# Patient Record
Sex: FEMALE | Race: WHITE | NOT HISPANIC OR LATINO | URBAN - METROPOLITAN AREA
[De-identification: names, ages, dates, MRNs, and addresses within clinical notes are randomized per-mention and may not be internally consistent; named-entity substitution may affect disease eponyms.]

---

## 2019-03-29 ENCOUNTER — OUTPATIENT (OUTPATIENT)
Dept: OUTPATIENT SERVICES | Facility: HOSPITAL | Age: 54
LOS: 1 days | Discharge: STILL ADMITTED OR ADMINISTRATIVE | End: 2019-03-29

## 2019-04-02 ENCOUNTER — LABORATORY RESULT (OUTPATIENT)
Age: 54
End: 2019-04-02

## 2019-04-02 ENCOUNTER — OUTPATIENT (OUTPATIENT)
Dept: OUTPATIENT SERVICES | Facility: HOSPITAL | Age: 54
LOS: 1 days | End: 2019-04-02

## 2019-04-02 ENCOUNTER — MED ADMIN CHARGE (OUTPATIENT)
Age: 54
End: 2019-04-02

## 2019-04-02 ENCOUNTER — OTHER (OUTPATIENT)
Age: 54
End: 2019-04-02

## 2019-04-02 ENCOUNTER — APPOINTMENT (OUTPATIENT)
Dept: INTERNAL MEDICINE | Facility: HOSPITAL | Age: 54
End: 2019-04-02
Payer: MEDICAID

## 2019-04-02 ENCOUNTER — TRANSCRIPTION ENCOUNTER (OUTPATIENT)
Age: 54
End: 2019-04-02

## 2019-04-02 VITALS — SYSTOLIC BLOOD PRESSURE: 90 MMHG | HEART RATE: 63 BPM | DIASTOLIC BLOOD PRESSURE: 50 MMHG

## 2019-04-02 VITALS — HEIGHT: 64 IN | WEIGHT: 107 LBS | BODY MASS INDEX: 18.27 KG/M2

## 2019-04-02 DIAGNOSIS — Z81.8 FAMILY HISTORY OF OTHER MENTAL AND BEHAVIORAL DISORDERS: ICD-10-CM

## 2019-04-02 DIAGNOSIS — Z82.62 FAMILY HISTORY OF OSTEOPOROSIS: ICD-10-CM

## 2019-04-02 LAB
ALBUMIN SERPL ELPH-MCNC: 4.3 G/DL — SIGNIFICANT CHANGE UP (ref 3.3–5)
ALP SERPL-CCNC: 44 U/L — SIGNIFICANT CHANGE UP (ref 40–120)
ALT FLD-CCNC: 18 U/L — SIGNIFICANT CHANGE UP (ref 4–33)
ANION GAP SERPL CALC-SCNC: 11 MMO/L — SIGNIFICANT CHANGE UP (ref 7–14)
AST SERPL-CCNC: 23 U/L — SIGNIFICANT CHANGE UP (ref 4–32)
BASOPHILS # BLD AUTO: 0.05 K/UL — SIGNIFICANT CHANGE UP (ref 0–0.2)
BASOPHILS NFR BLD AUTO: 0.8 % — SIGNIFICANT CHANGE UP (ref 0–2)
BILIRUB SERPL-MCNC: 0.5 MG/DL — SIGNIFICANT CHANGE UP (ref 0.2–1.2)
BUN SERPL-MCNC: 19 MG/DL — SIGNIFICANT CHANGE UP (ref 7–23)
CALCIUM SERPL-MCNC: 9.2 MG/DL — SIGNIFICANT CHANGE UP (ref 8.4–10.5)
CHLORIDE SERPL-SCNC: 103 MMOL/L — SIGNIFICANT CHANGE UP (ref 98–107)
CHOLEST SERPL-MCNC: 185 MG/DL — SIGNIFICANT CHANGE UP (ref 120–199)
CO2 SERPL-SCNC: 29 MMOL/L — SIGNIFICANT CHANGE UP (ref 22–31)
CREAT SERPL-MCNC: 0.66 MG/DL — SIGNIFICANT CHANGE UP (ref 0.5–1.3)
EOSINOPHIL # BLD AUTO: 0.11 K/UL — SIGNIFICANT CHANGE UP (ref 0–0.5)
EOSINOPHIL NFR BLD AUTO: 1.7 % — SIGNIFICANT CHANGE UP (ref 0–6)
GLUCOSE SERPL-MCNC: 76 MG/DL — SIGNIFICANT CHANGE UP (ref 70–99)
HCT VFR BLD CALC: 42.5 % — SIGNIFICANT CHANGE UP (ref 34.5–45)
HDLC SERPL-MCNC: 80 MG/DL — HIGH (ref 45–65)
HGB BLD-MCNC: 13.8 G/DL — SIGNIFICANT CHANGE UP (ref 11.5–15.5)
IMM GRANULOCYTES NFR BLD AUTO: 0.3 % — SIGNIFICANT CHANGE UP (ref 0–1.5)
LIPID PNL WITH DIRECT LDL SERPL: 108 MG/DL — SIGNIFICANT CHANGE UP
LYMPHOCYTES # BLD AUTO: 2.05 K/UL — SIGNIFICANT CHANGE UP (ref 1–3.3)
LYMPHOCYTES # BLD AUTO: 31.9 % — SIGNIFICANT CHANGE UP (ref 13–44)
MCHC RBC-ENTMCNC: 29.2 PG — SIGNIFICANT CHANGE UP (ref 27–34)
MCHC RBC-ENTMCNC: 32.5 % — SIGNIFICANT CHANGE UP (ref 32–36)
MCV RBC AUTO: 89.9 FL — SIGNIFICANT CHANGE UP (ref 80–100)
MONOCYTES # BLD AUTO: 0.63 K/UL — SIGNIFICANT CHANGE UP (ref 0–0.9)
MONOCYTES NFR BLD AUTO: 9.8 % — SIGNIFICANT CHANGE UP (ref 2–14)
NEUTROPHILS # BLD AUTO: 3.56 K/UL — SIGNIFICANT CHANGE UP (ref 1.8–7.4)
NEUTROPHILS NFR BLD AUTO: 55.5 % — SIGNIFICANT CHANGE UP (ref 43–77)
NRBC # FLD: 0 K/UL — SIGNIFICANT CHANGE UP (ref 0–0)
PLATELET # BLD AUTO: 249 K/UL — SIGNIFICANT CHANGE UP (ref 150–400)
PMV BLD: 11.2 FL — SIGNIFICANT CHANGE UP (ref 7–13)
POTASSIUM SERPL-MCNC: 4.1 MMOL/L — SIGNIFICANT CHANGE UP (ref 3.5–5.3)
POTASSIUM SERPL-SCNC: 4.1 MMOL/L — SIGNIFICANT CHANGE UP (ref 3.5–5.3)
PROT SERPL-MCNC: 6.6 G/DL — SIGNIFICANT CHANGE UP (ref 6–8.3)
RBC # BLD: 4.73 M/UL — SIGNIFICANT CHANGE UP (ref 3.8–5.2)
RBC # FLD: 12.9 % — SIGNIFICANT CHANGE UP (ref 10.3–14.5)
SODIUM SERPL-SCNC: 143 MMOL/L — SIGNIFICANT CHANGE UP (ref 135–145)
T4 FREE SERPL-MCNC: 1.45 NG/DL — SIGNIFICANT CHANGE UP (ref 0.9–1.8)
TRIGL SERPL-MCNC: 78 MG/DL — SIGNIFICANT CHANGE UP (ref 10–149)
TSH SERPL-MCNC: < 0.1 UIU/ML — LOW (ref 0.27–4.2)
WBC # BLD: 6.42 K/UL — SIGNIFICANT CHANGE UP (ref 3.8–10.5)
WBC # FLD AUTO: 6.42 K/UL — SIGNIFICANT CHANGE UP (ref 3.8–10.5)

## 2019-04-02 PROCEDURE — 99214 OFFICE O/P EST MOD 30 MIN: CPT | Mod: GC

## 2019-04-03 LAB
24R-OH-CALCIDIOL SERPL-MCNC: 65.5 NG/ML — SIGNIFICANT CHANGE UP (ref 30–80)
MEV IGG SER-ACNC: >300 AU/ML — SIGNIFICANT CHANGE UP
MEV IGG+IGM SER-IMP: POSITIVE — SIGNIFICANT CHANGE UP
MUV AB SER-ACNC: POSITIVE — SIGNIFICANT CHANGE UP
MUV IGG FLD-ACNC: >300 AU/ML — SIGNIFICANT CHANGE UP
RUBV IGG SER-ACNC: 2.2 INDEX — SIGNIFICANT CHANGE UP
RUBV IGG SER-IMP: POSITIVE — SIGNIFICANT CHANGE UP

## 2019-04-03 NOTE — REVIEW OF SYSTEMS
08/29/18 1000   Signing Clinician's Name / Credentials   Signing clinician's name / credentials Marilyn Irvin OTR/L   Quick Adds   Rehab Discipline OT   Therapeutic Activity   Minutes of Treatment 35 minutes   Symptoms Noted During/After Treatment None   Treatment Detail Chakra connection completed with patient. to aid with relaxation and well being. Patient notes anxiety due to recent aspiration of the fluid pouch on her (R) chest.     Therapeutic Exercise   Minutes of Treatment 5   Symptoms Noted During/After Treatment none   Treatment Detail OK to begin s/p mastectomy stretches and teaching of lymphedema massage from Dr. Parth Vaughan on 8/23/18. Held teaching of edema massage due to recent testing of fluid pouch on (R) side. Did review UE stretches with patient.    Additional Documentation   Rehab Comments Pt. reports negative thinking about recent procedures and has concerns about death and wanting quality of life.    OT Plan cont 3x/week for healing touch modalities and healthy coping skills as well as stretches and manual edema massage when appropriate.    Total Session Time   Total Session Time (minutes) 40 minutes      [Constipation] : constipation [Anxiety] : anxiety [Depression] : depression [Negative] : Neurological [Suicidal] : not suicidal [Insomnia] : no insomnia [FreeTextEntry7] : chronic constipation

## 2019-04-03 NOTE — PHYSICAL EXAM
[No Acute Distress] : no acute distress [Well Nourished] : well nourished [Well Developed] : well developed [Normal Sclera/Conjunctiva] : normal sclera/conjunctiva [Normal Outer Ear/Nose] : the outer ears and nose were normal in appearance [Supple] : supple [No Respiratory Distress] : no respiratory distress  [Clear to Auscultation] : lungs were clear to auscultation bilaterally [Normal Rate] : normal rate  [Regular Rhythm] : with a regular rhythm [Normal S1, S2] : normal S1 and S2 [No Murmur] : no murmur heard [Soft] : abdomen soft [Non Tender] : non-tender [Non-distended] : non-distended [No Masses] : no abdominal mass palpated [No HSM] : no HSM [No Joint Swelling] : no joint swelling [No Rash] : no rash [Speech Grossly Normal] : speech grossly normal [Normal Insight/Judgement] : insight and judgment were intact [de-identified] : thin female in NAD

## 2019-04-03 NOTE — HEALTH RISK ASSESSMENT
[Fair] :  ~his/her~ mood as fair [0-5] : 0-5 [No falls in past year] : Patient reported no falls in the past year [2] : 2) Feeling down, depressed, or hopeless for more than half of the days (2) [Patient reported mammogram was normal] : Patient reported mammogram was normal [Patient reported PAP Smear was normal] : Patient reported PAP Smear was normal [Patient reported bone density results were abnormal] : Patient reported bone density results were abnormal [Patient reported colonoscopy was normal] : Patient reported colonoscopy was normal [With Family] : lives with family [] : No [YearQuit] : 2009 [[id number]] : 5 [MammogramDate] : 06/17 [PapSmearDate] : 06/18 [BoneDensityDate] : 11/18 [ColonoscopyDate] : 01/19

## 2019-04-03 NOTE — HISTORY OF PRESENT ILLNESS
[Family Member] : family member [FreeTextEntry1] : establish care [de-identified] : 53F w/ depression (since 2001), hypothyroidism, osteoporosis, who presents to establish regular medical care following visit to [facility] last Friday. 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 Pt states that she recently took on too many responsibilities and did not realize that her coworkers found it difficult to work for her; multiple complaints were filed and pt was dismissed from her job. Pt had numerous panic attacks, and finally decided to move back to [place] with her parents. She is accompanied today by her twin sister, who is helping her get back on her feet. Sister and pt state that when she first arrived, pt had significantly low mood, guilt, trouble sleeping and fidgeting; however, she has been slowly improving. She has started school for [employer] (pt's sister does coding for [email or web address]) and needs forms filled out today.  She was initially seeing her sister's psychiatrist (sister also has depression) but would like to establish care with someone else. Her citalopram was recently increased to 40mg qd and she was recently started on remeron 15mg qd, which she has been tolerating well. Denies suidical/homicidal ideations. PHQ9 =5 today. \par \par Pt had recent DEXA (hip T-3.5, total  T -2.7, records in chart). Pt was started on alendronic acid 70mg qweekly in 11/18. Her mother also has osteoporosis. No recent falls or bone pain. \par \par ROS neg for headaches, CP, SOB, urinary or bowel changes.

## 2019-04-03 NOTE — ASSESSMENT
[FreeTextEntry1] : 53F w/ hypothyroidism, depression and osteoporosis presenting to establish care.\par \par #Depression, PHQ9 = 5\par -pt appears to be coping well w/ recent stress of losing job. is now back in school for coding\par -referral to care mgmt given for psychiatrist\par -c/w citalopram 40mg qd, remeron 15mg qhs\par -stress counseling provided\par \par #Hypothyroidism\par -c/w 100mcg synthroid daily\par -TFTs sent today\par \par #Osteoporosis\par -c/w alendronic acid 70mg qweekly\par -last DEXA 11/18 showning osteoporosis\par -started on Vit D 1000U daily\par -Ca, Vit D levels sent today\par \par #HCM\par -routine labs - cbc, cmp, lipid panel today\par -MMR titers drawn for school, form to be filled out later this week\par -last DEXA 11/19 showing osteoporosis\par -Mammogram ( last 6/17), PAP+HPV (last 6/18), colonoscopy screening wnl and UTD (next rec'd at age 58)\par -flu shot today\par \par RTC in 10-15wks\par Deshawn Horan, PGY1\par Firm 1\par Case d/w Dr Euceda

## 2019-04-05 ENCOUNTER — RESULT REVIEW (OUTPATIENT)
Age: 54
End: 2019-04-05

## 2019-04-12 DIAGNOSIS — Z23 ENCOUNTER FOR IMMUNIZATION: ICD-10-CM

## 2019-04-12 DIAGNOSIS — F32.9 MAJOR DEPRESSIVE DISORDER, SINGLE EPISODE, UNSPECIFIED: ICD-10-CM

## 2019-06-10 ENCOUNTER — LABORATORY RESULT (OUTPATIENT)
Age: 54
End: 2019-06-10

## 2019-06-10 ENCOUNTER — APPOINTMENT (OUTPATIENT)
Dept: INTERNAL MEDICINE | Facility: CLINIC | Age: 54
End: 2019-06-10
Payer: MEDICAID

## 2019-06-10 ENCOUNTER — OUTPATIENT (OUTPATIENT)
Dept: OUTPATIENT SERVICES | Facility: HOSPITAL | Age: 54
LOS: 1 days | End: 2019-06-10

## 2019-06-10 VITALS
SYSTOLIC BLOOD PRESSURE: 90 MMHG | BODY MASS INDEX: 17.24 KG/M2 | OXYGEN SATURATION: 100 % | WEIGHT: 101 LBS | DIASTOLIC BLOOD PRESSURE: 50 MMHG | HEART RATE: 63 BPM | HEIGHT: 64 IN

## 2019-06-10 LAB
T4 FREE SERPL-MCNC: 0.93 NG/DL — SIGNIFICANT CHANGE UP (ref 0.9–1.8)
TSH SERPL-MCNC: 1.34 UIU/ML — SIGNIFICANT CHANGE UP (ref 0.27–4.2)

## 2019-06-10 PROCEDURE — 99213 OFFICE O/P EST LOW 20 MIN: CPT | Mod: GE

## 2019-06-10 RX ORDER — CITALOPRAM 10 MG/1
10 TABLET, FILM COATED ORAL
Refills: 0 | Status: ACTIVE | COMMUNITY
Start: 2019-04-02

## 2019-06-10 RX ORDER — DIAZEPAM 2 MG/1
2 TABLET ORAL
Refills: 0 | Status: DISCONTINUED | COMMUNITY
Start: 2019-04-02 | End: 2019-06-10

## 2019-06-10 RX ORDER — ESTRADIOL 0.1 MG/G
0.1 CREAM VAGINAL
Qty: 1 | Refills: 3 | Status: DISCONTINUED | COMMUNITY
Start: 2019-04-02 | End: 2019-06-10

## 2019-06-10 RX ORDER — MIRTAZAPINE 15 MG/1
15 TABLET, FILM COATED ORAL
Qty: 30 | Refills: 3 | Status: ACTIVE | COMMUNITY
Start: 2019-04-02 | End: 1900-01-01

## 2019-06-10 RX ORDER — POLYETHYLENE GLYCOL 3350 17 G/17G
17 POWDER, FOR SOLUTION ORAL
Qty: 5 | Refills: 5 | Status: ACTIVE | COMMUNITY
Start: 2019-04-02 | End: 1900-01-01

## 2019-06-11 LAB
HCV AB S/CO SERPL IA: 0.09 S/CO — SIGNIFICANT CHANGE UP (ref 0–0.99)
HCV AB SERPL-IMP: SIGNIFICANT CHANGE UP
HCV RNA SERPL NAA DL=5-ACNC: NOT DETECTED IU/ML — SIGNIFICANT CHANGE UP
HCV RNA SPEC NAA+PROBE-LOG IU: SIGNIFICANT CHANGE UP LOGIU/ML
HIV 1+2 AB+HIV1 P24 AG SERPL QL IA: SIGNIFICANT CHANGE UP

## 2019-06-13 NOTE — END OF VISIT
[] : Resident [FreeTextEntry3] : 54yo F with depression, hypothyroidism here to follow up after establishing with us 2 mo ago. in intermin has established with a psychiatrist at [facility]. Pt's hypothyroid dose reduced by 100mcg to 88mcg and sx resolved. Ranger recommended at age 58. Pt will bring in remainder of screening results. SHe may need shingrix in the near future.

## 2019-06-13 NOTE — REVIEW OF SYSTEMS
[Negative] : Musculoskeletal [Fever] : no fever [Hot Flashes] : no hot flashes [Chills] : no chills [Night Sweats] : no night sweats [Abdominal Pain] : no abdominal pain [Dysuria] : no dysuria

## 2019-06-13 NOTE — HISTORY OF PRESENT ILLNESS
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 She was dismissed and had several episodes of depressed mood, trouble sleeping and fidgeting, including one visit to [facility] for anxiety. She was referred to several psychiatry centers per care mgmt referral.\par Today, pt reports she has been feeling well. 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 She is also volunteering at [facility] regularly. She has been seeing a psychiatrist at [facility] (Dr Jessica Spivey ?sp). Her citalopram has been increased to 50mg qd and she has been continuing remeron 15mg qhs. \par Hypothyroidism: At last visit, pt was noted to have TSH<0.10 on 100mcg synthroid and reported palpitations, diarrhea, so it was decreased to 88mcg. Today, pt reports resolution of symptoms. She started taking the 88mcg of levothyroxine only about 4wks ago, as she was still receiving 100mcg from her old pharmacy and did not realize she had to switch her dose until more recently.\par \par Osteoporosis: last DEXA (hip T-3.5, total T -2.7 in ). Pt was started on alendronic acid 70mg qweekly in 11/18. Her mother also has osteoporosis. No recent falls or bone pain. \par \par Otherwise, ROS neg for recent wt loss, fatigue, headaches, CP, SOB, urinary or bowel changes.

## 2019-06-13 NOTE — HEALTH RISK ASSESSMENT
[0-5] : 0-5 [No falls in past year] : Patient reported no falls in the past year [0] : 2) Feeling down, depressed, or hopeless: Not at all (0) [YearQuit] : 2009 [de-identified] : former smoker [] : No

## 2019-06-13 NOTE — ASSESSMENT
[FreeTextEntry1] : 53F w/ depression on citalopram, remeron (since 2001), hypothyroidism, osteoporosis who is presenting for followup of chronic medical conditions.\par \par See above for detailed A/P of chronic conditions\par \par #HCM\par -MMR titers drawn last visit, immune\par -last DEXA 11/19 showing osteoporosis.\par -Mammogram ( last 6/17), PAP+HPV (last 6/18), colonoscopy screening wnl and UTD (next rec'd at age 58)\par -flu shot 2/19\par -Tdap - will review old records next visit. pt to bring in\par -HIV/HCV next visit\par \par RTC in 6mths for f.u of hypothyroidism and depression\par Case d/w Dr Pitts\par \par Deshawn Horan, PGY1\par Firm 1

## 2019-06-13 NOTE — PHYSICAL EXAM
[No Acute Distress] : no acute distress [Normal Sclera/Conjunctiva] : normal sclera/conjunctiva [Well-Appearing] : well-appearing [Normal Outer Ear/Nose] : the outer ears and nose were normal in appearance [Normal Oropharynx] : the oropharynx was normal [Supple] : supple [No Respiratory Distress] : no respiratory distress  [Clear to Auscultation] : lungs were clear to auscultation bilaterally [Normal Rate] : normal rate  [Non Tender] : non-tender [Soft] : abdomen soft [No Edema] : there was no peripheral edema [Non-distended] : non-distended [Normal Gait] : normal gait [No Rash] : no rash [de-identified] : thin female, pleasant

## 2019-06-14 DIAGNOSIS — N89.8 OTHER SPECIFIED NONINFLAMMATORY DISORDERS OF VAGINA: ICD-10-CM

## 2019-06-14 DIAGNOSIS — F32.9 MAJOR DEPRESSIVE DISORDER, SINGLE EPISODE, UNSPECIFIED: ICD-10-CM

## 2019-06-14 DIAGNOSIS — E03.9 HYPOTHYROIDISM, UNSPECIFIED: ICD-10-CM

## 2019-06-14 DIAGNOSIS — Z00.00 ENCOUNTER FOR GENERAL ADULT MEDICAL EXAMINATION WITHOUT ABNORMAL FINDINGS: ICD-10-CM

## 2019-06-14 DIAGNOSIS — M81.0 AGE-RELATED OSTEOPOROSIS WITHOUT CURRENT PATHOLOGICAL FRACTURE: ICD-10-CM

## 2019-06-14 DIAGNOSIS — K59.00 CONSTIPATION, UNSPECIFIED: ICD-10-CM

## 2019-10-21 ENCOUNTER — RX RENEWAL (OUTPATIENT)
Age: 54
End: 2019-10-21

## 2019-10-22 ENCOUNTER — OTHER (OUTPATIENT)
Age: 54
End: 2019-10-22

## 2019-11-29 ENCOUNTER — RX RENEWAL (OUTPATIENT)
Age: 54
End: 2019-11-29

## 2020-05-18 ENCOUNTER — RX RENEWAL (OUTPATIENT)
Age: 55
End: 2020-05-18

## 2020-05-27 ENCOUNTER — APPOINTMENT (OUTPATIENT)
Dept: INTERNAL MEDICINE | Facility: CLINIC | Age: 55
End: 2020-05-27

## 2020-06-24 ENCOUNTER — RX RENEWAL (OUTPATIENT)
Age: 55
End: 2020-06-24

## 2020-07-22 ENCOUNTER — APPOINTMENT (OUTPATIENT)
Dept: INTERNAL MEDICINE | Facility: CLINIC | Age: 55
End: 2020-07-22

## 2020-08-05 ENCOUNTER — LABORATORY RESULT (OUTPATIENT)
Age: 55
End: 2020-08-05

## 2020-08-05 ENCOUNTER — APPOINTMENT (OUTPATIENT)
Dept: INTERNAL MEDICINE | Facility: CLINIC | Age: 55
End: 2020-08-05
Payer: MEDICAID

## 2020-08-05 ENCOUNTER — OUTPATIENT (OUTPATIENT)
Dept: OUTPATIENT SERVICES | Facility: HOSPITAL | Age: 55
LOS: 1 days | End: 2020-08-05

## 2020-08-05 VITALS
SYSTOLIC BLOOD PRESSURE: 100 MMHG | HEART RATE: 77 BPM | OXYGEN SATURATION: 96 % | WEIGHT: 110 LBS | DIASTOLIC BLOOD PRESSURE: 60 MMHG | HEIGHT: 64 IN | BODY MASS INDEX: 18.78 KG/M2

## 2020-08-05 DIAGNOSIS — N89.8 OTHER SPECIFIED NONINFLAMMATORY DISORDERS OF VAGINA: ICD-10-CM

## 2020-08-05 DIAGNOSIS — Z87.19 PERSONAL HISTORY OF OTHER DISEASES OF THE DIGESTIVE SYSTEM: ICD-10-CM

## 2020-08-05 LAB
HBA1C BLD-MCNC: 5.3 % — SIGNIFICANT CHANGE UP (ref 4–5.6)
T4 FREE SERPL-MCNC: 0.92 NG/DL — SIGNIFICANT CHANGE UP (ref 0.9–1.8)
TSH SERPL-MCNC: 1.43 UIU/ML — SIGNIFICANT CHANGE UP (ref 0.27–4.2)

## 2020-08-05 PROCEDURE — 99213 OFFICE O/P EST LOW 20 MIN: CPT | Mod: GE

## 2020-08-05 RX ORDER — CLOBETASOL PROPIONATE 0.5 MG/G
0.05 CREAM TOPICAL TWICE DAILY
Qty: 1 | Refills: 1 | Status: ACTIVE | COMMUNITY
Start: 2020-08-05 | End: 1900-01-01

## 2020-08-05 RX ORDER — DICLOFENAC SODIUM 10 MG/G
1 GEL TOPICAL DAILY
Qty: 1 | Refills: 0 | Status: ACTIVE | COMMUNITY
Start: 2020-08-05 | End: 1900-01-01

## 2020-08-05 NOTE — COUNSELING
[Fall prevention counseling provided] : Fall prevention counseling provided [Adequate lighting] : Adequate lighting [No throw rugs] : No throw rugs [Use proper foot wear] : Use proper foot wear [Behavioral health counseling provided] : Behavioral health counseling provided [Engage in a relaxing activity] : Engage in a relaxing activity

## 2020-08-05 NOTE — PHYSICAL EXAM
[No Acute Distress] : no acute distress [Well-Appearing] : well-appearing [Normal Outer Ear/Nose] : the outer ears and nose were normal in appearance [Normal Sclera/Conjunctiva] : normal sclera/conjunctiva [Supple] : supple [Normal Oropharynx] : the oropharynx was normal [No Respiratory Distress] : no respiratory distress  [Normal Rate] : normal rate  [Clear to Auscultation] : lungs were clear to auscultation bilaterally [No Edema] : there was no peripheral edema [Soft] : abdomen soft [Non-distended] : non-distended [Non Tender] : non-tender [Normal Gait] : normal gait [No Rash] : no rash [No Joint Swelling] : no joint swelling [Grossly Normal Strength/Tone] : grossly normal strength/tone [de-identified] : pleasant female, thin body habitus [de-identified] : mildly limited passive ROM with extension of R arm; no shoulder point tenderness, swelling or erythema; no difficulties with ab/adduction, flexion; L arm wnl

## 2020-08-05 NOTE — REVIEW OF SYSTEMS
[Negative] : Respiratory [Fever] : no fever [Chills] : no chills [Hot Flashes] : no hot flashes [Night Sweats] : no night sweats [Abdominal Pain] : no abdominal pain [Dysuria] : no dysuria [Joint Pain] : joint pain [Muscle Weakness] : no muscle weakness [Suicidal] : not suicidal
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
## 2020-08-05 NOTE — ASSESSMENT
[FreeTextEntry1] : 55F w/ depression on citalopram, remeron (since 2001), hypothyroidism, osteoporosis who is presenting for followup of chronic medical conditions.\par \par See above for detailed A/P of chronic conditions\par \par #HCM\par -MMR titers drawn last visit, immune\par -last DEXA 11/18 showing osteoporosis - will repeat\par -Mammogram ( last 6/17) - repeat ordered\par - PAP+HPV (last 6/18)- next 2023\par - colonoscopy screening wnl and UTD (next rec'd at age 58, 2023)\par -flu shot 2/19 - will administer next visit\par -Tdap - ordered today\par -HIV/HCV wnl\par \par RTC in 6mths \par \par Case d/w Dr Loki Lundberg\par Deshawn Horan, PGY3\par Firm 1

## 2020-08-05 NOTE — HEALTH RISK ASSESSMENT
[No falls in past year] : Patient reported no falls in the past year [0-5] : 0-5 [] : No [0] : 2) Feeling down, depressed, or hopeless: Not at all (0) [de-identified] : former smoker [YearQuit] : 2009

## 2020-08-05 NOTE — END OF VISIT
[] : Resident [Time Spent: ___ minutes] : I have spent [unfilled] minutes of time on the encounter. [FreeTextEntry3] : R shoulder pain- no red flag symptoms suggestive of rotator cuff tear. Symptomatic treatment. Limit use. If symptoms worsen can consider MRI to r/o tear\par Hypothyroidism- TFT's today. Synthroid 88mcg. Asymptomatic\par Lichen Sclerosis- Clobetasol cream and Derm referral \par

## 2020-08-07 DIAGNOSIS — E03.9 HYPOTHYROIDISM, UNSPECIFIED: ICD-10-CM

## 2020-08-07 DIAGNOSIS — Z23 ENCOUNTER FOR IMMUNIZATION: ICD-10-CM

## 2020-08-07 DIAGNOSIS — K59.00 CONSTIPATION, UNSPECIFIED: ICD-10-CM

## 2020-08-07 DIAGNOSIS — M81.0 AGE-RELATED OSTEOPOROSIS WITHOUT CURRENT PATHOLOGICAL FRACTURE: ICD-10-CM

## 2020-08-07 DIAGNOSIS — N89.8 OTHER SPECIFIED NONINFLAMMATORY DISORDERS OF VAGINA: ICD-10-CM

## 2020-08-07 DIAGNOSIS — F32.9 MAJOR DEPRESSIVE DISORDER, SINGLE EPISODE, UNSPECIFIED: ICD-10-CM

## 2020-08-07 DIAGNOSIS — S46.001A UNSPECIFIED INJURY OF MUSCLE(S) AND TENDON(S) OF THE ROTATOR CUFF OF RIGHT SHOULDER, INITIAL ENCOUNTER: ICD-10-CM

## 2020-08-07 DIAGNOSIS — L90.0 LICHEN SCLEROSUS ET ATROPHICUS: ICD-10-CM

## 2020-10-15 ENCOUNTER — RX RENEWAL (OUTPATIENT)
Age: 55
End: 2020-10-15

## 2020-10-20 ENCOUNTER — APPOINTMENT (OUTPATIENT)
Dept: MAMMOGRAPHY | Facility: IMAGING CENTER | Age: 55
End: 2020-10-20
Payer: MEDICAID

## 2020-10-20 ENCOUNTER — RESULT REVIEW (OUTPATIENT)
Age: 55
End: 2020-10-20

## 2020-10-20 ENCOUNTER — OUTPATIENT (OUTPATIENT)
Dept: OUTPATIENT SERVICES | Facility: HOSPITAL | Age: 55
LOS: 1 days | End: 2020-10-20
Payer: MEDICAID

## 2020-10-20 ENCOUNTER — APPOINTMENT (OUTPATIENT)
Dept: RADIOLOGY | Facility: IMAGING CENTER | Age: 55
End: 2020-10-20
Payer: MEDICAID

## 2020-10-20 DIAGNOSIS — M81.0 AGE-RELATED OSTEOPOROSIS WITHOUT CURRENT PATHOLOGICAL FRACTURE: ICD-10-CM

## 2020-10-20 DIAGNOSIS — Z00.00 ENCOUNTER FOR GENERAL ADULT MEDICAL EXAMINATION WITHOUT ABNORMAL FINDINGS: ICD-10-CM

## 2020-10-20 PROCEDURE — 77080 DXA BONE DENSITY AXIAL: CPT

## 2020-10-20 PROCEDURE — 77080 DXA BONE DENSITY AXIAL: CPT | Mod: 26

## 2020-10-20 PROCEDURE — 77067 SCR MAMMO BI INCL CAD: CPT | Mod: 26

## 2020-10-20 PROCEDURE — 77063 BREAST TOMOSYNTHESIS BI: CPT | Mod: 26

## 2020-10-20 PROCEDURE — 77063 BREAST TOMOSYNTHESIS BI: CPT

## 2020-10-20 PROCEDURE — 77067 SCR MAMMO BI INCL CAD: CPT

## 2020-10-22 ENCOUNTER — NON-APPOINTMENT (OUTPATIENT)
Age: 55
End: 2020-10-22

## 2020-10-27 ENCOUNTER — OUTPATIENT (OUTPATIENT)
Dept: OUTPATIENT SERVICES | Facility: HOSPITAL | Age: 55
LOS: 1 days | End: 2020-10-27
Payer: MEDICAID

## 2020-10-27 ENCOUNTER — RESULT REVIEW (OUTPATIENT)
Age: 55
End: 2020-10-27

## 2020-10-27 ENCOUNTER — APPOINTMENT (OUTPATIENT)
Dept: MAMMOGRAPHY | Facility: IMAGING CENTER | Age: 55
End: 2020-10-27
Payer: MEDICAID

## 2020-10-27 DIAGNOSIS — Z00.8 ENCOUNTER FOR OTHER GENERAL EXAMINATION: ICD-10-CM

## 2020-10-27 PROCEDURE — 77066 DX MAMMO INCL CAD BI: CPT

## 2020-10-27 PROCEDURE — 77066 DX MAMMO INCL CAD BI: CPT | Mod: 26

## 2020-11-09 ENCOUNTER — RX RENEWAL (OUTPATIENT)
Age: 55
End: 2020-11-09

## 2021-01-19 ENCOUNTER — RX RENEWAL (OUTPATIENT)
Age: 56
End: 2021-01-19

## 2021-03-03 ENCOUNTER — APPOINTMENT (OUTPATIENT)
Dept: INTERNAL MEDICINE | Facility: CLINIC | Age: 56
End: 2021-03-03

## 2021-03-08 ENCOUNTER — RX RENEWAL (OUTPATIENT)
Age: 56
End: 2021-03-08

## 2021-03-29 ENCOUNTER — RX RENEWAL (OUTPATIENT)
Age: 56
End: 2021-03-29

## 2021-05-11 ENCOUNTER — APPOINTMENT (OUTPATIENT)
Dept: INTERNAL MEDICINE | Facility: CLINIC | Age: 56
End: 2021-05-11
Payer: MEDICAID

## 2021-05-11 ENCOUNTER — OUTPATIENT (OUTPATIENT)
Dept: OUTPATIENT SERVICES | Facility: HOSPITAL | Age: 56
LOS: 1 days | End: 2021-05-11

## 2021-05-11 ENCOUNTER — RESULT REVIEW (OUTPATIENT)
Age: 56
End: 2021-05-11

## 2021-05-11 VITALS
BODY MASS INDEX: 19.81 KG/M2 | DIASTOLIC BLOOD PRESSURE: 60 MMHG | HEIGHT: 64 IN | OXYGEN SATURATION: 97 % | HEART RATE: 74 BPM | WEIGHT: 116 LBS | SYSTOLIC BLOOD PRESSURE: 112 MMHG

## 2021-05-11 VITALS — TEMPERATURE: 98 F

## 2021-05-11 DIAGNOSIS — F32.9 MAJOR DEPRESSIVE DISORDER, SINGLE EPISODE, UNSPECIFIED: ICD-10-CM

## 2021-05-11 DIAGNOSIS — M81.0 AGE-RELATED OSTEOPOROSIS W/OUT CURRENT PATHOLOGICAL FRACTURE: ICD-10-CM

## 2021-05-11 DIAGNOSIS — Z23 ENCOUNTER FOR IMMUNIZATION: ICD-10-CM

## 2021-05-11 DIAGNOSIS — Z00.00 ENCOUNTER FOR GENERAL ADULT MEDICAL EXAMINATION W/OUT ABNORMAL FINDINGS: ICD-10-CM

## 2021-05-11 DIAGNOSIS — L90.0 LICHEN SCLEROSUS ET ATROPHICUS: ICD-10-CM

## 2021-05-11 DIAGNOSIS — S46.001A UNSPECIFIED INJURY OF MUSCLE(S) AND TENDON(S) OF THE ROTATOR CUFF OF RIGHT SHOULDER, INITIAL ENCOUNTER: ICD-10-CM

## 2021-05-11 LAB
ALBUMIN SERPL ELPH-MCNC: 4.4 G/DL — SIGNIFICANT CHANGE UP (ref 3.3–5)
ALP SERPL-CCNC: 63 U/L — SIGNIFICANT CHANGE UP (ref 40–120)
ALT FLD-CCNC: 19 U/L — SIGNIFICANT CHANGE UP (ref 4–33)
ANION GAP SERPL CALC-SCNC: 5 MMOL/L — LOW (ref 7–14)
AST SERPL-CCNC: 27 U/L — SIGNIFICANT CHANGE UP (ref 4–32)
BASOPHILS # BLD AUTO: 0.07 K/UL — SIGNIFICANT CHANGE UP (ref 0–0.2)
BASOPHILS NFR BLD AUTO: 1.1 % — SIGNIFICANT CHANGE UP (ref 0–2)
BILIRUB SERPL-MCNC: 0.4 MG/DL — SIGNIFICANT CHANGE UP (ref 0.2–1.2)
BUN SERPL-MCNC: 20 MG/DL — SIGNIFICANT CHANGE UP (ref 7–23)
CALCIUM SERPL-MCNC: 9.2 MG/DL — SIGNIFICANT CHANGE UP (ref 8.4–10.5)
CHLORIDE SERPL-SCNC: 104 MMOL/L — SIGNIFICANT CHANGE UP (ref 98–107)
CHOLEST SERPL-MCNC: 204 MG/DL — HIGH
CO2 SERPL-SCNC: 32 MMOL/L — HIGH (ref 22–31)
CREAT SERPL-MCNC: 0.64 MG/DL — SIGNIFICANT CHANGE UP (ref 0.5–1.3)
EOSINOPHIL # BLD AUTO: 0.15 K/UL — SIGNIFICANT CHANGE UP (ref 0–0.5)
EOSINOPHIL NFR BLD AUTO: 2.3 % — SIGNIFICANT CHANGE UP (ref 0–6)
GLUCOSE SERPL-MCNC: 91 MG/DL — SIGNIFICANT CHANGE UP (ref 70–99)
HCT VFR BLD CALC: 41.7 % — SIGNIFICANT CHANGE UP (ref 34.5–45)
HDLC SERPL-MCNC: 86 MG/DL — SIGNIFICANT CHANGE UP
HGB BLD-MCNC: 12.8 G/DL — SIGNIFICANT CHANGE UP (ref 11.5–15.5)
IANC: 3.41 K/UL — SIGNIFICANT CHANGE UP (ref 1.5–8.5)
IMM GRANULOCYTES NFR BLD AUTO: 0.3 % — SIGNIFICANT CHANGE UP (ref 0–1.5)
LIPID PNL WITH DIRECT LDL SERPL: 101 MG/DL — HIGH
LYMPHOCYTES # BLD AUTO: 2.34 K/UL — SIGNIFICANT CHANGE UP (ref 1–3.3)
LYMPHOCYTES # BLD AUTO: 35.2 % — SIGNIFICANT CHANGE UP (ref 13–44)
MCHC RBC-ENTMCNC: 28.6 PG — SIGNIFICANT CHANGE UP (ref 27–34)
MCHC RBC-ENTMCNC: 30.7 GM/DL — LOW (ref 32–36)
MCV RBC AUTO: 93.1 FL — SIGNIFICANT CHANGE UP (ref 80–100)
MONOCYTES # BLD AUTO: 0.66 K/UL — SIGNIFICANT CHANGE UP (ref 0–0.9)
MONOCYTES NFR BLD AUTO: 9.9 % — SIGNIFICANT CHANGE UP (ref 2–14)
NEUTROPHILS # BLD AUTO: 3.41 K/UL — SIGNIFICANT CHANGE UP (ref 1.8–7.4)
NEUTROPHILS NFR BLD AUTO: 51.2 % — SIGNIFICANT CHANGE UP (ref 43–77)
NON HDL CHOLESTEROL: 118 MG/DL — SIGNIFICANT CHANGE UP
NRBC # BLD: 0 /100 WBCS — SIGNIFICANT CHANGE UP
NRBC # FLD: 0 K/UL — SIGNIFICANT CHANGE UP
PLATELET # BLD AUTO: 254 K/UL — SIGNIFICANT CHANGE UP (ref 150–400)
POTASSIUM SERPL-MCNC: 4.6 MMOL/L — SIGNIFICANT CHANGE UP (ref 3.5–5.3)
POTASSIUM SERPL-SCNC: 4.6 MMOL/L — SIGNIFICANT CHANGE UP (ref 3.5–5.3)
PROT SERPL-MCNC: 6.8 G/DL — SIGNIFICANT CHANGE UP (ref 6–8.3)
RBC # BLD: 4.48 M/UL — SIGNIFICANT CHANGE UP (ref 3.8–5.2)
RBC # FLD: 12.8 % — SIGNIFICANT CHANGE UP (ref 10.3–14.5)
SODIUM SERPL-SCNC: 141 MMOL/L — SIGNIFICANT CHANGE UP (ref 135–145)
T4 FREE SERPL-MCNC: 0.9 NG/DL — SIGNIFICANT CHANGE UP (ref 0.9–1.8)
TRIGL SERPL-MCNC: 86 MG/DL — SIGNIFICANT CHANGE UP
TSH SERPL-MCNC: 5.25 UIU/ML — HIGH (ref 0.27–4.2)
WBC # BLD: 6.65 K/UL — SIGNIFICANT CHANGE UP (ref 3.8–10.5)
WBC # FLD AUTO: 6.65 K/UL — SIGNIFICANT CHANGE UP (ref 3.8–10.5)

## 2021-05-11 PROCEDURE — 99213 OFFICE O/P EST LOW 20 MIN: CPT | Mod: GE

## 2021-05-11 RX ORDER — CHOLECALCIFEROL (VITAMIN D3) 25 MCG
25 MCG TABLET ORAL DAILY
Qty: 30 | Refills: 0 | Status: COMPLETED | COMMUNITY
Start: 2019-04-02 | End: 2021-05-11

## 2021-05-12 LAB — 24R-OH-CALCIDIOL SERPL-MCNC: 52.3 NG/ML — SIGNIFICANT CHANGE UP (ref 30–80)

## 2021-05-13 NOTE — HISTORY OF PRESENT ILLNESS
[FreeTextEntry1] : followup of chronic medical conditions [de-identified] : 55F w/ depression on citalopram, remeron (since 2001), hypothyroidism, osteoporosis on alendronate, lichen sclerosis who is presenting for followup of chronic medical conditions.\par \par Last IM visit was 8/2020. Pt reports that she has been doing very well. 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 Not volunteering at the hospital right now due to the pandemic. 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 Reports that her mood is good and is happy to have gained some weight (now 116lbs BMI 19 from 101 6/2019). Feels that she has come a long way (pt was previously working in the [employer] at an [employer] company x26yrs, then was suddenly fired so she came to live with parents in [place] to start her life over, was seen at [facility] for panic attacks and ultimately referred to our clinic to establish care w/ IM and outpatient psych). She continues to follow with her psychiatrist at [facility]  (Dr Jessica Spivey ?sp) and is on remeron/citalopram at a stable dose. Her R shoulder pain reported on last visit has also resolved with local analgesics. \par \par Her only complaint today is regarding her lichen sclerosis. Reports that she has continued pain and irritation with any touch, especially wiping. Previously in our practice she has tried estrogen cream/tablets and clobetasol, which she reports provided minimal relief. She has been using cotton underwear and loose pants only. She was referred to GYN, and reports seeing Dr Suazo (unable to locate records in [place]), where she reports she was unable to tolerate a pediatric speculum exam 2/2 significant pain. States that she was prescribed a cream that helps somewhat but she does not recall the name. Reports that she has never had a biopsy before. Her mother and grandmother both have lichen sclerosis. Denies vaginal bleeding/discharge. Feels that the vulvar region is becoming 'deformed'. Also notes the growth of two 'skin tags' within the last few months that are not painful. States that she did not bring them up during her GYN visit. She has been taking the cream every other day because she wasn’t sure how often she can take it. No FHx gyn malignancy. No recent weight loss/fevers. Not currently sexually active. No dysuria/urinary pain.  Pt reports that she would like to see another gynecologist given the traumatic experience/pain from her last exam. \par \par Otherwise, ROS neg for recent wt loss, fatigue, headaches, CP, SOB, urinary or bowel changes. Denies hypo/hyperthyroid symptoms.

## 2021-05-13 NOTE — HEALTH RISK ASSESSMENT
[0-5] : 0-5 [No falls in past year] : Patient reported no falls in the past year [0] : 2) Feeling down, depressed, or hopeless: Not at all (0) [] : No [de-identified] : former smoker [YearQuit] : 2009

## 2021-05-13 NOTE — REVIEW OF SYSTEMS
[Negative] : Respiratory [Fever] : no fever [Chills] : no chills [Hot Flashes] : no hot flashes [Night Sweats] : no night sweats [Abdominal Pain] : no abdominal pain [Dysuria] : no dysuria [Joint Pain] : no joint pain [Muscle Weakness] : no muscle weakness [Suicidal] : not suicidal [FreeTextEntry8] : + vulvar irritation, pain

## 2021-05-13 NOTE — END OF VISIT
[] : Resident [FreeTextEntry3] : H/O Depression, osteoporosis\par Depression well controlled on citalopram/remeron.\par Has some external genitalia discomfort secondary to lichen sclerosis.\par Agree with plan as per Dr. Horan.

## 2021-05-13 NOTE — PHYSICAL EXAM
[No Acute Distress] : no acute distress [Well-Appearing] : well-appearing [Normal Sclera/Conjunctiva] : normal sclera/conjunctiva [Normal Outer Ear/Nose] : the outer ears and nose were normal in appearance [Normal Oropharynx] : the oropharynx was normal [Supple] : supple [No Respiratory Distress] : no respiratory distress  [Clear to Auscultation] : lungs were clear to auscultation bilaterally [Normal Rate] : normal rate  [No Edema] : there was no peripheral edema [Soft] : abdomen soft [Non Tender] : non-tender [Non-distended] : non-distended [No Joint Swelling] : no joint swelling [Grossly Normal Strength/Tone] : grossly normal strength/tone [No Rash] : no rash [Normal Gait] : normal gait [Normal Affect] : the affect was normal [Normal Mood] : the mood was normal [Normal Insight/Judgement] : insight and judgment were intact [de-identified] : pleasant female, thin body habitus though appears more well-nourished  [FreeTextEntry1] : sl hypopigmentation of labial folds bilaterally w/ adhesion of labial fold and vulva on R; 2x2cm wart-like lesion at 11oclock on R and smaller subcm lesions 5 o' clock on left, both nontender, flesh-colored. no skin breaks, bleeding, vulva mildly tender to palpation

## 2021-05-14 DIAGNOSIS — L90.0 LICHEN SCLEROSUS ET ATROPHICUS: ICD-10-CM

## 2021-05-14 DIAGNOSIS — M81.0 AGE-RELATED OSTEOPOROSIS WITHOUT CURRENT PATHOLOGICAL FRACTURE: ICD-10-CM

## 2021-05-14 DIAGNOSIS — E03.9 HYPOTHYROIDISM, UNSPECIFIED: ICD-10-CM

## 2021-05-14 DIAGNOSIS — F32.9 MAJOR DEPRESSIVE DISORDER, SINGLE EPISODE, UNSPECIFIED: ICD-10-CM

## 2021-05-18 ENCOUNTER — NON-APPOINTMENT (OUTPATIENT)
Age: 56
End: 2021-05-18

## 2021-05-18 DIAGNOSIS — E03.9 HYPOTHYROIDISM, UNSPECIFIED: ICD-10-CM

## 2021-06-03 ENCOUNTER — RX RENEWAL (OUTPATIENT)
Age: 56
End: 2021-06-03

## 2021-06-24 ENCOUNTER — OUTPATIENT (OUTPATIENT)
Dept: OUTPATIENT SERVICES | Facility: HOSPITAL | Age: 56
LOS: 1 days | End: 2021-06-24

## 2021-06-24 ENCOUNTER — RESULT REVIEW (OUTPATIENT)
Age: 56
End: 2021-06-24

## 2021-06-24 ENCOUNTER — APPOINTMENT (OUTPATIENT)
Dept: INTERNAL MEDICINE | Facility: CLINIC | Age: 56
End: 2021-06-24

## 2021-06-24 VITALS — TEMPERATURE: 97.8 F

## 2021-06-24 DIAGNOSIS — E03.9 HYPOTHYROIDISM, UNSPECIFIED: ICD-10-CM

## 2021-06-24 DIAGNOSIS — Z00.00 ENCOUNTER FOR GENERAL ADULT MEDICAL EXAMINATION WITHOUT ABNORMAL FINDINGS: ICD-10-CM

## 2021-06-24 DIAGNOSIS — M81.0 AGE-RELATED OSTEOPOROSIS WITHOUT CURRENT PATHOLOGICAL FRACTURE: ICD-10-CM

## 2021-06-24 LAB
T4 FREE SERPL-MCNC: 1 NG/DL — SIGNIFICANT CHANGE UP (ref 0.9–1.8)
TSH SERPL-MCNC: 3.4 UIU/ML — SIGNIFICANT CHANGE UP (ref 0.27–4.2)

## 2021-06-25 ENCOUNTER — NON-APPOINTMENT (OUTPATIENT)
Age: 56
End: 2021-06-25

## 2021-06-28 ENCOUNTER — NON-APPOINTMENT (OUTPATIENT)
Age: 56
End: 2021-06-28

## 2021-06-29 ENCOUNTER — NON-APPOINTMENT (OUTPATIENT)
Age: 56
End: 2021-06-29

## 2021-06-29 RX ORDER — LEVOTHYROXINE SODIUM 0.09 MG/1
88 TABLET ORAL DAILY
Qty: 90 | Refills: 0 | Status: DISCONTINUED | COMMUNITY
Start: 2019-04-02 | End: 2021-06-29

## 2021-07-09 ENCOUNTER — RX RENEWAL (OUTPATIENT)
Age: 56
End: 2021-07-09

## 2021-10-12 ENCOUNTER — OUTPATIENT (OUTPATIENT)
Dept: OUTPATIENT SERVICES | Facility: HOSPITAL | Age: 56
LOS: 1 days | End: 2021-10-12

## 2021-10-12 ENCOUNTER — APPOINTMENT (OUTPATIENT)
Dept: INTERNAL MEDICINE | Facility: CLINIC | Age: 56
End: 2021-10-12

## 2021-10-12 ENCOUNTER — RESULT REVIEW (OUTPATIENT)
Age: 56
End: 2021-10-12

## 2021-10-12 VITALS — TEMPERATURE: 96.4 F

## 2021-10-12 DIAGNOSIS — E03.9 HYPOTHYROIDISM, UNSPECIFIED: ICD-10-CM

## 2021-10-12 DIAGNOSIS — L90.0 LICHEN SCLEROSUS ET ATROPHICUS: ICD-10-CM

## 2021-10-12 LAB
T4 FREE SERPL-MCNC: 1.2 NG/DL — SIGNIFICANT CHANGE UP (ref 0.9–1.8)
TSH SERPL-MCNC: 5.15 UIU/ML — HIGH (ref 0.27–4.2)

## 2021-10-13 ENCOUNTER — NON-APPOINTMENT (OUTPATIENT)
Age: 56
End: 2021-10-13

## 2021-10-25 ENCOUNTER — RX RENEWAL (OUTPATIENT)
Age: 56
End: 2021-10-25

## 2021-10-25 RX ORDER — MULTIVIT-MIN/FOLIC/VIT K/LYCOP 400-300MCG
25 MCG TABLET ORAL
Qty: 90 | Refills: 1 | Status: ACTIVE | COMMUNITY
Start: 2019-11-29 | End: 1900-01-01

## 2021-11-10 ENCOUNTER — TRANSCRIPTION ENCOUNTER (OUTPATIENT)
Age: 56
End: 2021-11-10

## 2021-12-15 ENCOUNTER — RX RENEWAL (OUTPATIENT)
Age: 56
End: 2021-12-15

## 2021-12-15 ENCOUNTER — NON-APPOINTMENT (OUTPATIENT)
Age: 56
End: 2021-12-15

## 2022-05-30 ENCOUNTER — NON-APPOINTMENT (OUTPATIENT)
Age: 57
End: 2022-05-30

## 2022-06-06 RX ORDER — ESTRADIOL 10 UG/1
10 TABLET, FILM COATED VAGINAL
Qty: 90 | Refills: 0 | Status: ACTIVE | COMMUNITY
Start: 2019-06-10 | End: 1900-01-01

## 2022-06-20 ENCOUNTER — RX RENEWAL (OUTPATIENT)
Age: 57
End: 2022-06-20

## 2022-06-20 RX ORDER — LEVOTHYROXINE SODIUM 0.1 MG/1
100 TABLET ORAL
Qty: 30 | Refills: 1 | Status: ACTIVE | COMMUNITY
Start: 2021-06-29 | End: 1900-01-01

## 2022-06-24 ENCOUNTER — RX RENEWAL (OUTPATIENT)
Age: 57
End: 2022-06-24

## 2022-06-24 RX ORDER — CHOLECALCIFEROL (VITAMIN D3) 50 MCG
25 MCG TABLET ORAL
Qty: 30 | Refills: 2 | Status: ACTIVE | COMMUNITY
Start: 2022-06-24 | End: 1900-01-01

## 2022-09-22 ENCOUNTER — NON-APPOINTMENT (OUTPATIENT)
Age: 57
End: 2022-09-22

## 2022-10-12 ENCOUNTER — RX RENEWAL (OUTPATIENT)
Age: 57
End: 2022-10-12

## 2022-10-12 RX ORDER — ALENDRONATE SODIUM 70 MG/1
70 TABLET ORAL
Qty: 12 | Refills: 1 | Status: ACTIVE | COMMUNITY
Start: 2019-04-02 | End: 1900-01-01

## 2022-12-23 ENCOUNTER — NON-APPOINTMENT (OUTPATIENT)
Age: 57
End: 2022-12-23

## 2024-05-03 ENCOUNTER — NON-APPOINTMENT (OUTPATIENT)
Age: 59
End: 2024-05-03

## 2024-05-05 ENCOUNTER — NON-APPOINTMENT (OUTPATIENT)
Age: 59
End: 2024-05-05

## 2024-12-07 ENCOUNTER — NON-APPOINTMENT (OUTPATIENT)
Age: 59
End: 2024-12-07

## 2025-03-26 ENCOUNTER — NON-APPOINTMENT (OUTPATIENT)
Age: 60
End: 2025-03-26